# Patient Record
Sex: MALE | URBAN - METROPOLITAN AREA
[De-identification: names, ages, dates, MRNs, and addresses within clinical notes are randomized per-mention and may not be internally consistent; named-entity substitution may affect disease eponyms.]

---

## 2021-06-24 ENCOUNTER — NURSE TRIAGE (OUTPATIENT)
Dept: NURSING | Facility: CLINIC | Age: 56
End: 2021-06-24

## 2021-06-24 NOTE — TELEPHONE ENCOUNTER
"Patient calling requesting to know how long alcohol withdrawal symptoms would last?     Reporting waking around 230 a.m. 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    Patient is denying other symptoms. Last alcohol intake around 930 pm last evening.     Encouraged to follow up with PCP today.    Reviewed signs and symptoms on reasons to seek care in ED.  Offered [facility] Intake phone number that patient declined.    Patient stating he will contact his PCP.    Karin Wheeler RN  [facility]      COVID 19 Nurse Triage Plan/Patient Instructions    Please be aware that novel coronavirus (COVID-19) may be circulating in the community. If you develop symptoms such as fever, cough, or SOB or if you have concerns about the presence of another infection including coronavirus (COVID-19), please contact your health care provider or visit [email or web address].     Disposition/Instructions    Virtual Visit with provider recommended. Reference Visit Selection Guide.    Thank you for taking steps to prevent the spread of this virus.  o Limit your contact with others.  o Wear a simple mask to cover your cough.  o Wash your hands well and often.    Resources    [facility]: About COVID-19: [email or web address]    CDC: What to Do If You're Sick: www.cdc.gov/coronavirus/2019-ncov/about/steps-when-sick.html    CDC: Ending Home Isolation: www.cdc.gov/coronavirus/2019-ncov/hcp/disposition-in-home-patients.html     CDC: Caring for Someone: www.cdc.gov/coronavirus/2019-ncov/if-you-are-sick/care-for-someone.html     [facility]: Interim Guidance for Hospital Discharge to Home: www.health.[facility]/diseases/coronavirus/hcp/hospdischarge.pdf    [facility] clinical trials (COVID-19 research studies): [email or web address]     Below are the COVID-19 hotlines at the [place] [facility] ([facility]). Interpreters are available. "   o For health questions: Call 533-364-0349 or 1-731.659.5160 (7 a.m. to 7 p.m.)  o For questions about schools and childcare: Call 192-294-0317 or 1-754.782.5028 (7 a.m. to 7 p.m.)                     Reason for Disposition    Drinks alcohol daily and prior DTs (delirium tremens)    Additional Information    Negative: Coma (e.g., not moving, not talking, not responding to stimuli)    Negative: Difficult to awaken or acting confused (e.g., disoriented, slurred speech)    Negative: Seeing, hearing, or feeling things that are not there (i.e., visual, auditory, or tactile hallucinations)    Negative: Slow, shallow and weak breathing    Negative: Seizure    Negative: Violent behavior, or threatening to physically hurt or kill someone    Negative: Patient attempted suicide    Negative: Threatening suicide    Negative: Sounds like a life-threatening emergency to the triager    Negative: SEVERE abdominal pain (e.g., excruciating)    Negative: Constant abdominal pain lasting > 2 hours    Negative: Bloody, black, or tarry bowel movements (Exception: chronic-unchanged black-grey bowel movements and is taking iron pills or Pepto-bismol)    Negative: Vomiting red blood or black (coffee ground) material (Exception: few red streaks in vomit that only happened once)    Negative: Multiple episodes of vomiting and lasting more than 2 hours    Negative: Feeling very shaky (i.e., visible tremors of hands)    Negative: Patient sounds very sick or weak to the triager    Negative: White of the eyes have turned yellow (i.e., jaundice)    Negative: Fever > 101 F (38.3 C)    Negative: Drinks alcohol daily and prior alcohol withdrawal seizures    Protocols used: ALCOHOL ABUSE AND [id number]